# Patient Record
Sex: MALE | Race: BLACK OR AFRICAN AMERICAN | Employment: UNEMPLOYED | URBAN - METROPOLITAN AREA
[De-identification: names, ages, dates, MRNs, and addresses within clinical notes are randomized per-mention and may not be internally consistent; named-entity substitution may affect disease eponyms.]

---

## 2018-10-05 ENCOUNTER — OFFICE VISIT (OUTPATIENT)
Dept: FAMILY MEDICINE CLINIC | Age: 23
End: 2018-10-05

## 2018-10-05 VITALS
WEIGHT: 138.6 LBS | OXYGEN SATURATION: 97 % | BODY MASS INDEX: 21.75 KG/M2 | RESPIRATION RATE: 16 BRPM | DIASTOLIC BLOOD PRESSURE: 65 MMHG | HEIGHT: 67 IN | HEART RATE: 71 BPM | SYSTOLIC BLOOD PRESSURE: 104 MMHG | TEMPERATURE: 98.2 F

## 2018-10-05 DIAGNOSIS — L72.0 EPIDERMAL CYST OF NECK: Primary | ICD-10-CM

## 2018-10-05 NOTE — MR AVS SNAPSHOT
[facility] 
 
 
 [address] [address] [address] [address] 
[phone] Patient: Bryanna Gibbs MRN: [id number] :1995 Visit Information Date & Time Provider Department Dept. Phone Encounter #  
 10/5/2018  2:00 PM Jossue Macias NP Jeet Romulo [address] [id number] Upcoming Health Maintenance Date Due  
 HPV Age 9Y-34Y (3 of 1 - Male 3 Dose Series) 2008 DTaP/Tdap/Td series (1 - Tdap) 2018 Influenza Age 5 to Adult 2018 Allergies as of 10/5/2018  Review Complete On: 10/5/2018 By: Jossue Macias NP Severity Noted Reaction Type Reactions Cranberry Extract  10/05/2018    Hives Fruit Current Immunizations  Never Reviewed No immunizations on file. Not reviewed this visit You Were Diagnosed With   
  
 Codes Comments Epidermal cyst of neck    -  Primary ICD-10-CM: L72.0 ICD-9-CM: 706. 2 Vitals BP Pulse Temp Resp Height(growth percentile) Weight(growth percentile) 104/65 (BP 1 Location: Left arm) 71 98.2 °F (36.8 °C) (Oral) 16 5' 7\" (1.702 m) 138 lb 9.6 oz (62.9 kg) SpO2 BMI Smoking Status 97% 21.71 kg/m2 Never Smoker BMI and BSA Data Body Mass Index Body Surface Area 21.71 kg/m 2 1.72 m 2 Your Updated Medication List  
  
   
This list is accurate as of 10/5/18  2:31 PM.  Always use your most recent med list.  
  
  
  
  
 [place] Take  by mouth daily. Patient Instructions A Healthy Lifestyle: Care Instructions Your Care Instructions A healthy lifestyle can help you feel good, stay at a healthy weight, and have plenty of energy for both work and play. A healthy lifestyle is something you can share with your whole family. A healthy lifestyle also can lower your risk for serious health problems, such as high blood pressure, heart disease, and diabetes. You can follow a few steps listed below to improve your health and the health of your family. Follow-up care is a key part of your treatment and safety. Be sure to make and go to all appointments, and call your doctor if you are having problems. It's also a good idea to know your test results and keep a list of the medicines you take. How can you care for yourself at home? · Do not eat too much sugar, fat, or fast foods. You can still have dessert and treats now and then. The goal is moderation. · Start small to improve your eating habits. Pay attention to portion sizes, drink less juice and soda pop, and eat more fruits and vegetables. ¨ Eat a healthy amount of food. A 3-ounce serving of meat, for example, is about the size of a deck of cards. Fill the rest of your plate with vegetables and whole grains. ¨ Limit the amount of soda and sports drinks you have every day. Drink more water when you are thirsty. ¨ Eat at least 5 servings of fruits and vegetables every day. It may seem like a lot, but it is not hard to reach this goal. A serving or helping is 1 piece of fruit, 1 cup of vegetables, or 2 cups of leafy, raw vegetables. Have an apple or some carrot sticks as an afternoon snack instead of a candy bar. Try to have fruits and/or vegetables at every meal. 
· Make exercise part of your daily routine. You may want to start with simple activities, such as walking, bicycling, or slow swimming. Try to be active 30 to 60 minutes every day. You do not need to do all 30 to 60 minutes all at once. For example, you can exercise 3 times a day for 10 or 20 minutes. Moderate exercise is safe for most people, but it is always a good idea to talk to your doctor before starting an exercise program. 
· Keep moving. Helyn Distad the lawn, work in the garden, or [place]. Take the stairs instead of the elevator at work. · If you smoke, quit.  People who smoke have an increased risk for heart attack, stroke, cancer, and other lung illnesses. Quitting is hard, but there are ways to boost your chance of quitting tobacco for good. ¨ Use nicotine gum, patches, or lozenges. ¨ Ask your doctor about stop-smoking programs and medicines. ¨ Keep trying. In addition to reducing your risk of diseases in the future, you will notice some benefits soon after you stop using tobacco. If you have shortness of breath or asthma symptoms, they will likely get better within a few weeks after you quit. · Limit how much alcohol you drink. Moderate amounts of alcohol (up to 2 drinks a day for men, 1 drink a day for women) are okay. But drinking too much can lead to liver problems, high blood pressure, and other health problems. Family health If you have a family, there are many things you can do together to improve your health. · Eat meals together as a family as often as possible. · Eat healthy foods. This includes fruits, vegetables, lean meats and dairy, and whole grains. · Include your family in your fitness plan. Most people think of activities such as jogging or tennis as the way to fitness, but there are many ways you and your family can be more active. Anything that makes you breathe hard and gets your heart pumping is exercise. Here are some tips: 
¨ Walk to do errands or to take your child to school or the bus. ¨ Go for a family bike ride after dinner instead of watching TV. Where can you learn more? Go to http://payton-grupo.info/. Enter Z512 in the search box to learn more about \"A Healthy Lifestyle: Care Instructions. \" Current as of: December 7, 2017 Content Version: 11.8 © 4741-6958 [employer]. Care instructions adapted under license by [employer] (which disclaims liability or warranty for this information).  If you have questions about a medical condition or this instruction, always ask your healthcare professional. [employer] disclaims any warranty or liability for your use of this information. Introducing [facility]! [facility] introduces [employer] patient portal. Now you can access parts of your medical record, email your doctor's office, and request medication refills online. 1. In your internet browser, go to [email or web address] 2. Click on the First Time User? Click Here link in the Sign In box. You will see the New Member Sign Up page. 3. Enter your [employer] Access Code exactly as it appears below. You will not need to use this code after youve completed the sign-up process. If you do not sign up before the expiration date, you must request a new code. · [employer] Access Code: [email or web address] Expires: 1/3/2019  2:31 PM 
 
4. Enter the last four digits of your Social Security Number (xxxx) and Date of Birth (mm/dd/yyyy) as indicated and click Submit. You will be taken to the next sign-up page. 5. Create a [employer] ID. This will be your [employer] login ID and cannot be changed, so think of one that is secure and easy to remember. 6. Create a [employer] password. You can change your password at any time. 7. Enter your Password Reset Question and Answer. This can be used at a later time if you forget your password. 8. Enter your e-mail address. You will receive e-mail notification when new information is available in [address] 9. Click Sign Up. You can now view and download portions of your medical record. 10. Click the Download Summary menu link to download a portable copy of your medical information. If you have questions, please visit the Frequently Asked Questions section of the [employer] website. Remember, [employer] sent[employer] is NOT to be used for urgent needs. For medical emergencies, dial 911. Now available from your iPhone and Android! Please provide this summary of care documentation to your next provider. If you have any questions after today's visit, please call [phone].

## 2018-10-05 NOTE — LETTER
NOTIFICATION RETURN TO WORK / SCHOOL 
 
10/5/2018 2:30 PM 
 
Mr. Phi Painter [address] [facility] [address] To Whom It May Concern: 
 
Phi Painter [facility] He was seen today for a epidermal cyst of the neck. This is benign. If there are questions or concerns please have the patient contact our office. Sincerely, Jono Phillips NP

## 2018-10-05 NOTE — PROGRESS NOTES
Pt is here to establish care. C/O lump on neck x 10+ years    1. Have you been to the ER, urgent care clinic since your last visit? Hospitalized since your last visit? No    2. Have you seen or consulted any other health care providers outside of the [address] [place] [address] since your last visit? Include any pap smears or colon screening.  No

## 2018-10-05 NOTE — PROGRESS NOTES
HISTORY OF PRESENT ILLNESS  Zhou Hidalgo is a 26 y.o. male. Patient presents lump on neck. HPI  Pt wants to join the [employer]. He had labs today and was told to have this lump on his neck checked out by his PCP. He states he has had this since he was a teenager. Review of Systems   Constitutional: Negative. HENT: Negative. Respiratory: Negative. Cardiovascular: Negative. Visit Vitals    /65 (BP 1 Location: Left arm)    Pulse 71    Temp 98.2 °F (36.8 °C) (Oral)    Resp 16    Ht 5' 7\" (1.702 m)    Wt 138 lb 9.6 oz (62.9 kg)    SpO2 97%    BMI 21.71 kg/m2     Physical Exam   Constitutional: He appears well-developed. No distress. HENT:   Right Ear: Tympanic membrane and external ear normal.   Left Ear: Tympanic membrane and external ear normal.   Nose: Nose normal.   Mouth/Throat: Oropharynx is clear and moist. No oropharyngeal exudate. Neck: Normal range of motion. Neck supple. Cardiovascular: Normal rate, regular rhythm and normal heart sounds. No murmur heard. Pulmonary/Chest: Effort normal and breath sounds normal. No respiratory distress. He has no wheezes. He has no rales. skin: neck there is a round mobile fluctuant mass about the size of a small marble. ASSESSMENT and PLAN    ICD-10-CM ICD-9-CM    1. Epidermal cyst of neck L72.0 706.2      PLAN:  I reassured Pt that this is benign. He may see a gen surg who can remove this. Pt is to call with any concerns. Pt given after visit summary.

## 2018-10-05 NOTE — PATIENT INSTRUCTIONS

## 2018-11-02 ENCOUNTER — TELEPHONE (OUTPATIENT)
Dept: FAMILY MEDICINE CLINIC | Age: 23
End: 2018-11-02

## 2018-11-02 NOTE — TELEPHONE ENCOUNTER
Pt request referral to ENT.  Stated per the [employer] (he is joining [facility]) they need him to see a specialist re the cyst on his neck

## 2018-11-06 DIAGNOSIS — L72.0 EPIDERMAL CYST OF NECK: Primary | ICD-10-CM

## 2018-12-03 ENCOUNTER — TELEPHONE (OUTPATIENT)
Dept: FAMILY MEDICINE CLINIC | Age: 23
End: 2018-12-03

## 2018-12-03 NOTE — TELEPHONE ENCOUNTER
Staff Levon Shipman is calling to check on the status of the form that she faxed over for the pt , Please advise.        [phone]

## 2018-12-04 NOTE — TELEPHONE ENCOUNTER
Staff Ross Morrow calling for status. Stated hopeful to receive this soon.  Requesting please process, pt waiting to join

## 2018-12-24 ENCOUNTER — TELEPHONE (OUTPATIENT)
Dept: FAMILY MEDICINE CLINIC | Age: 23
End: 2018-12-24

## 2018-12-24 NOTE — TELEPHONE ENCOUNTER
Chandrika referral request was submitted with office notes for approval for Dr Ricardo Shultz for an appointment on 12/26/18.  [employer] pended referral request for approval.

## 2018-12-24 NOTE — TELEPHONE ENCOUNTER
Isabel with [facility] re Ins referral     Pt has appt 12/26/2018 and has   [address] [phone]    Has an ins referral been sent    They are open until 3 pm today

## 2018-12-24 NOTE — TELEPHONE ENCOUNTER
Robbin Menjivar from [facility] is calling to speak with the referral coordinator, she needs to verify the pt's referral , the referral says urgent but she is questioning it . Please Advise.       [phone]

## 2018-12-26 NOTE — TELEPHONE ENCOUNTER
Isabel from [address] [place] [address] is calling to gat that the patient was a No Show. Patient phone number on file is unresponsive and she is unable to leave any messages.

## 2019-01-04 NOTE — TELEPHONE ENCOUNTER
Per [facility] referral patient was referred to be seen at [facility].  Approval [id number]

## 2019-01-09 ENCOUNTER — OFFICE VISIT (OUTPATIENT)
Dept: SURGERY | Age: 24
End: 2019-01-09

## 2019-03-11 ENCOUNTER — HOSPITAL ENCOUNTER (OUTPATIENT)
Dept: LAB | Age: 24
Discharge: HOME | End: 2019-03-11
Payer: OTHER GOVERNMENT

## 2019-03-11 ENCOUNTER — OFFICE VISIT (OUTPATIENT)
Dept: FAMILY MEDICINE CLINIC | Age: 24
End: 2019-03-11

## 2019-03-11 VITALS
OXYGEN SATURATION: 98 % | SYSTOLIC BLOOD PRESSURE: 109 MMHG | BODY MASS INDEX: 22.6 KG/M2 | DIASTOLIC BLOOD PRESSURE: 60 MMHG | WEIGHT: 144 LBS | RESPIRATION RATE: 17 BRPM | TEMPERATURE: 98.3 F | HEIGHT: 67 IN | HEART RATE: 75 BPM

## 2019-03-11 DIAGNOSIS — Z00.00 VISIT FOR WELL MAN HEALTH CHECK: Primary | ICD-10-CM

## 2019-03-11 DIAGNOSIS — Z13.29 SCREENING FOR THYROID DISORDER: ICD-10-CM

## 2019-03-11 DIAGNOSIS — Z13.220 SCREENING FOR HYPERLIPIDEMIA: ICD-10-CM

## 2019-03-11 DIAGNOSIS — Z13.0 SCREENING FOR DEFICIENCY ANEMIA: ICD-10-CM

## 2019-03-11 DIAGNOSIS — Z13.1 SCREENING FOR DIABETES MELLITUS: ICD-10-CM

## 2019-03-11 DIAGNOSIS — Z13.21 ENCOUNTER FOR VITAMIN DEFICIENCY SCREENING: ICD-10-CM

## 2019-03-11 LAB
ALBUMIN SERPL-MCNC: 4.6 G/DL (ref 3.4–5)
ALBUMIN/GLOB SERPL: 1.6 {RATIO} (ref 0.8–1.7)
ALP SERPL-CCNC: 131 U/L (ref 45–117)
ALT SERPL-CCNC: 18 U/L (ref 16–61)
ANION GAP SERPL CALC-SCNC: 5 MMOL/L (ref 3–18)
AST SERPL-CCNC: 15 U/L (ref 15–37)
BILIRUB SERPL-MCNC: 0.6 MG/DL (ref 0.2–1)
BUN SERPL-MCNC: 13 MG/DL (ref 7–18)
BUN/CREAT SERPL: 16 (ref 12–20)
CALCIUM SERPL-MCNC: 9.7 MG/DL (ref 8.5–10.1)
CHLORIDE SERPL-SCNC: 103 MMOL/L (ref 100–108)
CHOLEST SERPL-MCNC: 119 MG/DL
CO2 SERPL-SCNC: 31 MMOL/L (ref 21–32)
CREAT SERPL-MCNC: 0.8 MG/DL (ref 0.6–1.3)
GLOBULIN SER CALC-MCNC: 2.8 G/DL (ref 2–4)
GLUCOSE SERPL-MCNC: 89 MG/DL (ref 74–99)
HCT VFR BLD AUTO: 44.3 % (ref 36–48)
HDLC SERPL-MCNC: 67 MG/DL (ref 40–60)
HDLC SERPL: 1.8 {RATIO} (ref 0–5)
HGB BLD-MCNC: 14.5 G/DL (ref 13–16)
LDLC SERPL CALC-MCNC: 44 MG/DL (ref 0–100)
LIPID PROFILE,FLP: ABNORMAL
POTASSIUM SERPL-SCNC: 4.4 MMOL/L (ref 3.5–5.5)
PROT SERPL-MCNC: 7.4 G/DL (ref 6.4–8.2)
SODIUM SERPL-SCNC: 139 MMOL/L (ref 136–145)
TRIGL SERPL-MCNC: 40 MG/DL (ref ?–150)
TSH SERPL DL<=0.05 MIU/L-ACNC: 1.1 UIU/ML (ref 0.36–3.74)
VLDLC SERPL CALC-MCNC: 8 MG/DL

## 2019-03-11 PROCEDURE — 82306 VITAMIN D 25 HYDROXY: CPT

## 2019-03-11 PROCEDURE — 80053 COMPREHEN METABOLIC PANEL: CPT

## 2019-03-11 PROCEDURE — 84443 ASSAY THYROID STIM HORMONE: CPT

## 2019-03-11 PROCEDURE — 80061 LIPID PANEL: CPT

## 2019-03-11 PROCEDURE — 85018 HEMOGLOBIN: CPT

## 2019-03-11 PROCEDURE — 36415 COLL VENOUS BLD VENIPUNCTURE: CPT

## 2019-03-11 NOTE — PROGRESS NOTES
Subjective:   Juliet Ly is a 26 y.o. male presenting for his annual checkup. ROS:  Feeling well. No dyspnea or chest pain on exertion. No abdominal pain, change in bowel habits, black or bloody stools. No urinary tract or prostatic symptoms. No neurological complaints. Specific concerns today: none. Patient Active Problem List    Diagnosis Date Noted    Allergic rhinitis 04/25/2008     Current Outpatient Medications   Medication Sig Dispense Refill    Cetirizine (ZYRTEC) 10 mg cap Take  by mouth.  emtricitabine/tenofovir, TDF, (TRUVADA PO) Take  by mouth daily. 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 Family History   Problem Relation Age of Onset    No Known Problems Mother      Social History     Tobacco Use    Smoking status: Never Smoker    Smokeless tobacco: Never Used   Substance Use Topics    Alcohol use: Not on file             Visit Vitals  /60 (BP 1 Location: Left arm, BP Patient Position: Sitting)   Pulse 75   Temp 98.3 °F (36.8 °C) (Oral)   Resp 17   Ht 5' 7\" (1.702 m)   Wt 144 lb (65.3 kg)   SpO2 98%   BMI 22.55 kg/m²         Objective: There were no vitals taken for this visit. The patient appears well, alert, oriented x 3, in no distress. ENT normal.  Neck supple. No adenopathy or thyromegaly. NICOL. Lungs are clear, good air entry, no wheezes, rhonchi or rales. S1 and S2 normal, no murmurs, regular rate and rhythm.  Abdomen is soft without tenderness, guarding, mass or organomegaly.  exam: no penile lesions or discharge, no testicular masses or tenderness, no hernias. Extremities show no edema, normal peripheral pulses. Neurological is normal without focal findings. Assessment/Plan:   healthy adult male  routine labs ordered. ICD-10-CM ICD-9-CM    1. Visit for well man health check Z00.00 V70.0    2. Screening for diabetes mellitus B14.4 I20.0 METABOLIC PANEL, COMPREHENSIVE   3. Screening for thyroid disorder Z13.29 V77.0 TSH 3RD GENERATION   4. Encounter for vitamin deficiency screening Z13.21 V77.99 VITAMIN D, 25 HYDROXY   5. Screening for deficiency anemia Z13.0 V78.1 HGB & HCT   6. Screening for hyperlipidemia Z13.220 V77.91 LIPID PANEL   . PLAN:  We discussed screening recommendations. I have discussed the diagnosis with the patient and the intended plan as seen in the above orders. The patient has received an after-visit summary and questions were answered concerning future plans. I have discussed medication side effects and warnings with the patient as well. Patient will call for further questions. Follow-up Disposition:  Return in about 1 year (around 3/11/2020) for [facility].

## 2019-03-11 NOTE — PROGRESS NOTES
Chief Complaint   Patient presents with    Well Male     1. Have you been to the ER, urgent care clinic since your last visit? Hospitalized since your last visit? No    2. Have you seen or consulted any other health care providers outside of the [address] [place] [address] since your last visit? Include any pap smears or colon screening.  No

## 2019-03-12 LAB — 25(OH)D3 SERPL-MCNC: 10.2 NG/ML (ref 30–100)

## 2019-03-13 RX ORDER — ERGOCALCIFEROL 1.25 MG/1
50000 CAPSULE ORAL
Qty: 12 CAP | Refills: 3 | Status: SHIPPED | OUTPATIENT
Start: 2019-03-13 | End: 2020-10-16 | Stop reason: ALTCHOICE

## 2020-04-30 RX ORDER — ERGOCALCIFEROL 1.25 MG/1
CAPSULE ORAL
Qty: 4 CAP | Refills: 0 | OUTPATIENT
Start: 2020-04-30

## 2020-06-26 ENCOUNTER — OFFICE VISIT (OUTPATIENT)
Dept: FAMILY MEDICINE CLINIC | Age: 25
End: 2020-06-26

## 2020-06-26 VITALS
BODY MASS INDEX: 22.6 KG/M2 | OXYGEN SATURATION: 97 % | RESPIRATION RATE: 18 BRPM | TEMPERATURE: 99.8 F | WEIGHT: 144 LBS | HEIGHT: 67 IN | SYSTOLIC BLOOD PRESSURE: 105 MMHG | HEART RATE: 101 BPM | DIASTOLIC BLOOD PRESSURE: 67 MMHG

## 2020-06-26 DIAGNOSIS — Z13.1 SCREENING FOR DIABETES MELLITUS: ICD-10-CM

## 2020-06-26 DIAGNOSIS — Z13.21 ENCOUNTER FOR VITAMIN DEFICIENCY SCREENING: ICD-10-CM

## 2020-06-26 DIAGNOSIS — Z13.220 SCREENING FOR HYPERLIPIDEMIA: ICD-10-CM

## 2020-06-26 DIAGNOSIS — Z13.0 SCREENING FOR DEFICIENCY ANEMIA: ICD-10-CM

## 2020-06-26 DIAGNOSIS — Z00.00 VISIT FOR WELL MAN HEALTH CHECK: Primary | ICD-10-CM

## 2020-06-26 DIAGNOSIS — Z13.29 SCREENING FOR THYROID DISORDER: ICD-10-CM

## 2020-06-26 RX ORDER — ERGOCALCIFEROL 1.25 MG/1
50000 CAPSULE ORAL
Qty: 12 CAP | Refills: 3 | Status: CANCELLED | OUTPATIENT
Start: 2020-06-26

## 2020-06-26 NOTE — PROGRESS NOTES
Subjective:   Val Wade is a 23 y.o. male presenting for his annual checkup. ROS:  Feeling well. No dyspnea or chest pain on exertion. No abdominal pain, change in bowel habits, black or bloody stools. No urinary tract or prostatic symptoms. No neurological complaints. Specific concerns today: none. He is doing well. 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 Patient Active Problem List    Diagnosis Date Noted    Allergic rhinitis 04/25/2008     Current Outpatient Medications   Medication Sig Dispense Refill    ergocalciferol (ERGOCALCIFEROL) 50,000 unit capsule Take 1 Cap by mouth every seven (7) days. 12 Cap 3    Cetirizine (ZYRTEC) 10 mg cap Take  by mouth. 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 Family History   Problem Relation Age of Onset    No Known Problems Mother      Social History     Tobacco Use    Smoking status: Never Smoker    Smokeless tobacco: Never Used   Substance Use Topics    Alcohol use: Not on file        Objective:     Visit Vitals  /67   Pulse (!) 101   Temp 99.8 °F (37.7 °C) (Skin)   Resp 18   Ht 5' 7\" (1.702 m)   Wt 144 lb (65.3 kg)   SpO2 97%   BMI 22.55 kg/m²     The patient appears well, alert, oriented x 3, in no distress. ENT normal.  Neck supple. No adenopathy or thyromegaly. NICOL. Lungs are clear, good air entry, no wheezes, rhonchi or rales. S1 and S2 normal, no murmurs, regular rate and rhythm.  Abdomen is soft without tenderness, guarding, mass or organomegaly.  exam: no penile lesions or discharge, no testicular masses or tenderness, no hernias. Extremities show no edema, normal peripheral pulses. Neurological is normal without focal findings. Assessment/Plan:   healthy adult male  routine labs ordered. ICD-10-CM ICD-9-CM    1. Visit for well man health check Z00.00 V70.0    2. Screening for diabetes mellitus E52.2 H38.7 METABOLIC PANEL, COMPREHENSIVE   3. Screening for thyroid disorder Z13.29 V77.0 TSH 3RD GENERATION   4. Encounter for vitamin deficiency screening Z13.21 V77.99 VITAMIN D, 25 HYDROXY   5. Screening for deficiency anemia Z13.0 V78.1 HGB & HCT   6. Screening for hyperlipidemia Z13.220 V77.91 LIPID PANEL   . PLAN:    I have discussed the diagnosis with the patient and the intended plan as seen in the above orders. The patient has received an after-visit summary and questions were answered concerning future plans. I have discussed medication side effects and warnings with the patient as well. Patient will call for further questions. Follow-up and Dispositions    · Return in about 1 year (around 6/26/2021) for annual wellness.

## 2020-06-26 NOTE — PROGRESS NOTES
Terese Parker is a  23 y.o. male presents today for office visit for a general check up. 1. Have you been to the ER, urgent care clinic or hospitalized since your last visit? NO     2. Have you seen or consulted any other health care providers outside of the [address] [place] [address] since your last visit (Include any pap smears or colon screening)? NO

## 2020-07-01 ENCOUNTER — HOSPITAL ENCOUNTER (OUTPATIENT)
Dept: LAB | Age: 25
Discharge: HOME | End: 2020-07-01
Payer: COMMERCIAL

## 2020-07-01 DIAGNOSIS — Z13.21 ENCOUNTER FOR VITAMIN DEFICIENCY SCREENING: ICD-10-CM

## 2020-07-01 DIAGNOSIS — Z13.0 SCREENING FOR DEFICIENCY ANEMIA: ICD-10-CM

## 2020-07-01 DIAGNOSIS — Z13.220 SCREENING FOR HYPERLIPIDEMIA: ICD-10-CM

## 2020-07-01 DIAGNOSIS — Z13.1 SCREENING FOR DIABETES MELLITUS: ICD-10-CM

## 2020-07-01 DIAGNOSIS — Z13.29 SCREENING FOR THYROID DISORDER: ICD-10-CM

## 2020-07-01 PROCEDURE — 80061 LIPID PANEL: CPT

## 2020-07-01 PROCEDURE — 84443 ASSAY THYROID STIM HORMONE: CPT

## 2020-07-01 PROCEDURE — 80053 COMPREHEN METABOLIC PANEL: CPT

## 2020-07-01 PROCEDURE — 82306 VITAMIN D 25 HYDROXY: CPT

## 2020-07-01 PROCEDURE — 85018 HEMOGLOBIN: CPT

## 2020-07-02 LAB
25(OH)D3 SERPL-MCNC: 14.9 NG/ML (ref 30–100)
ALBUMIN SERPL-MCNC: 4.6 G/DL (ref 3.4–5)
ALBUMIN/GLOB SERPL: 1.3 {RATIO} (ref 0.8–1.7)
ALP SERPL-CCNC: 99 U/L (ref 45–117)
ALT SERPL-CCNC: 26 U/L (ref 16–61)
ANION GAP SERPL CALC-SCNC: 5 MMOL/L (ref 3–18)
AST SERPL-CCNC: 12 U/L (ref 10–38)
BILIRUB SERPL-MCNC: 0.7 MG/DL (ref 0.2–1)
BUN SERPL-MCNC: 15 MG/DL (ref 7–18)
BUN/CREAT SERPL: 16 (ref 12–20)
CALCIUM SERPL-MCNC: 10.1 MG/DL (ref 8.5–10.1)
CHLORIDE SERPL-SCNC: 105 MMOL/L (ref 100–111)
CHOLEST SERPL-MCNC: 145 MG/DL
CO2 SERPL-SCNC: 29 MMOL/L (ref 21–32)
CREAT SERPL-MCNC: 0.95 MG/DL (ref 0.6–1.3)
GLOBULIN SER CALC-MCNC: 3.5 G/DL (ref 2–4)
GLUCOSE SERPL-MCNC: 77 MG/DL (ref 74–99)
HCT VFR BLD AUTO: 43.2 % (ref 36–48)
HDLC SERPL-MCNC: 53 MG/DL (ref 40–60)
HDLC SERPL: 2.7 {RATIO} (ref 0–5)
HGB BLD-MCNC: 14.8 G/DL (ref 13–16)
LDLC SERPL CALC-MCNC: 75 MG/DL (ref 0–100)
LIPID PROFILE,FLP: NORMAL
POTASSIUM SERPL-SCNC: 4.3 MMOL/L (ref 3.5–5.5)
PROT SERPL-MCNC: 8.1 G/DL (ref 6.4–8.2)
SODIUM SERPL-SCNC: 139 MMOL/L (ref 136–145)
TRIGL SERPL-MCNC: 85 MG/DL (ref ?–150)
TSH SERPL DL<=0.05 MIU/L-ACNC: 1.08 UIU/ML (ref 0.36–3.74)
VLDLC SERPL CALC-MCNC: 17 MG/DL

## 2020-10-15 ENCOUNTER — VIRTUAL VISIT (OUTPATIENT)
Dept: FAMILY MEDICINE CLINIC | Age: 25
End: 2020-10-15

## 2020-10-15 NOTE — PROGRESS NOTES
This encounter was created in error - please disregard. Doxy Link sent x 2, no response, no show for appointment.

## 2020-10-16 ENCOUNTER — VIRTUAL VISIT (OUTPATIENT)
Dept: FAMILY MEDICINE CLINIC | Age: 25
End: 2020-10-16
Payer: COMMERCIAL

## 2020-10-16 DIAGNOSIS — L25.9 CONTACT DERMATITIS, UNSPECIFIED CONTACT DERMATITIS TYPE, UNSPECIFIED TRIGGER: Primary | ICD-10-CM

## 2020-10-16 DIAGNOSIS — E55.9 VITAMIN D DEFICIENCY: ICD-10-CM

## 2020-10-16 PROCEDURE — 99213 OFFICE O/P EST LOW 20 MIN: CPT | Performed by: NURSE PRACTITIONER

## 2020-10-16 RX ORDER — TRIAMCINOLONE ACETONIDE 0.25 MG/G
CREAM TOPICAL 2 TIMES DAILY
Qty: 15 G | Refills: 0 | Status: SHIPPED | OUTPATIENT
Start: 2020-10-16 | End: 2021-08-23 | Stop reason: SDUPTHER

## 2020-10-16 RX ORDER — ERGOCALCIFEROL 1.25 MG/1
50000 CAPSULE ORAL
Qty: 12 CAP | Refills: 3 | Status: SHIPPED | OUTPATIENT
Start: 2020-10-16 | End: 2022-09-29 | Stop reason: ALTCHOICE

## 2020-10-16 RX ORDER — FEXOFENADINE HCL AND PSEUDOEPHEDRINE HCI 60; 120 MG/1; MG/1
1 TABLET, EXTENDED RELEASE ORAL EVERY 12 HOURS
COMMUNITY

## 2020-10-16 NOTE — PROGRESS NOTES
Amanda Stinson is a 23 y.o. male who was seen by synchronous (real-time) audio-video technology on 10/16/2020 for Rash and Vitamin D Deficiency    Pt is being evaluated for a left arm rash that has been present for 2-3 weeks. It is papular and itchy in nature. Pt denies a change in soaps, detergents or any other trigger that may have precipitated the rash. Pt does state that he had eczema as a child that looks very similar to his current symptoms. Pt's labs from July also show that he is still very deficient in vit. D show a new prescription was sent over for replacement. Assessment & Plan:   Diagnoses and all orders for this visit:    1. Contact dermatitis, unspecified contact dermatitis type, unspecified trigger  -     triamcinolone acetonide (KENALOG) 0.025 % topical cream; Apply  to affected area two (2) times a day. use thin layer    2. Vitamin D deficiency  -     ergocalciferol (ERGOCALCIFEROL) 1,250 mcg (50,000 unit) capsule; Take 1 Cap by mouth every seven (7) days. Follow-up and Dispositions    · Return in about 2 weeks (around 10/30/2020), or if symptoms worsen or fail to improve. 712  Subjective:       Prior to Admission medications    Medication Sig Start Date End Date Taking? Authorizing Provider   fexofenadine-pseudoephedrine (Allegra-D 12 Hour)  mg Tb12 Take 1 Tab by mouth every twelve (12) hours. Yes Provider, Historical   triamcinolone acetonide (KENALOG) 0.025 % topical cream Apply  to affected area two (2) times a day. use thin layer 10/16/20  Yes Jareth Tubbs NP   ergocalciferol (ERGOCALCIFEROL) 1,250 mcg (50,000 unit) capsule Take 1 Cap by mouth every seven (7) days. 10/16/20  Yes Jareth Tubbs NP   ergocalciferol (ERGOCALCIFEROL) 50,000 unit capsule Take 1 Cap by mouth every seven (7) days. 3/13/19 10/16/20  Wilfredo Otero NP   Cetirizine (ZYRTEC) 10 mg cap Take  by mouth.     Provider, Historical     Patient Active Problem List   Diagnosis Code    Allergic rhinitis J30.9    Vitamin D deficiency E55.9    Contact dermatitis L25.9     Current Outpatient Medications   Medication Sig Dispense Refill    fexofenadine-pseudoephedrine (Allegra-D 12 Hour)  mg Tb12 Take 1 Tab by mouth every twelve (12) hours.  triamcinolone acetonide (KENALOG) 0.025 % topical cream Apply  to affected area two (2) times a day. use thin layer 15 g 0    ergocalciferol (ERGOCALCIFEROL) 1,250 mcg (50,000 unit) capsule Take 1 Cap by mouth every seven (7) days. 12 Cap 3    Cetirizine (ZYRTEC) 10 mg cap Take  by mouth. 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 11/09/2011    External records       Review of Systems   Skin: Positive for itching and rash. Objective:   No flowsheet data found. General: alert, cooperative, no distress   Mental  status: normal mood, behavior, speech, dress, motor activity, and thought processes, able to follow commands   HENT: NCAT   Neck: no visualized mass   Resp: no respiratory distress   Neuro: no gross deficits   Skin: no discoloration or lesions of concern on visible areas   Psychiatric: normal affect, consistent with stated mood, no evidence of hallucinations     Additional exam findings: N/A      We discussed the expected course, resolution and complications of the diagnosis(es) in detail. Medication risks, benefits, costs, interactions, and alternatives were discussed as indicated.   I advised him to contact the office if his condition worsens, changes or fails to improve as anticipated. He expressed understanding with the diagnosis(es) and plan. Teressa Pacheco, who was evaluated through a patient-initiated, synchronous (real-time) audio-video encounter, and/or his healthcare decision maker, is aware that it is a billable service, with coverage as determined by his insurance carrier. He provided verbal consent to proceed: Yes, and patient identification was verified. It was conducted pursuant to the emergency declaration under the [address] [place] [address] and the [employer] and [employer] General Act. A caregiver was present when appropriate. Ability to conduct physical exam was limited. I was in the office. The patient was at home.       Gin Cartwright NP

## 2021-08-23 ENCOUNTER — HOSPITAL ENCOUNTER (OUTPATIENT)
Dept: LAB | Age: 26
Discharge: HOME | End: 2021-08-23
Payer: COMMERCIAL

## 2021-08-23 ENCOUNTER — OFFICE VISIT (OUTPATIENT)
Dept: FAMILY MEDICINE CLINIC | Age: 26
End: 2021-08-23
Payer: COMMERCIAL

## 2021-08-23 VITALS
DIASTOLIC BLOOD PRESSURE: 72 MMHG | OXYGEN SATURATION: 95 % | HEIGHT: 67 IN | SYSTOLIC BLOOD PRESSURE: 116 MMHG | HEART RATE: 69 BPM | TEMPERATURE: 97.3 F | BODY MASS INDEX: 30.98 KG/M2 | RESPIRATION RATE: 16 BRPM | WEIGHT: 197.4 LBS

## 2021-08-23 DIAGNOSIS — L25.9 CONTACT DERMATITIS, UNSPECIFIED CONTACT DERMATITIS TYPE, UNSPECIFIED TRIGGER: ICD-10-CM

## 2021-08-23 DIAGNOSIS — Z00.00 ROUTINE MEDICAL EXAM: Primary | ICD-10-CM

## 2021-08-23 DIAGNOSIS — Z00.00 ROUTINE MEDICAL EXAM: ICD-10-CM

## 2021-08-23 DIAGNOSIS — E55.9 VITAMIN D DEFICIENCY: ICD-10-CM

## 2021-08-23 DIAGNOSIS — E66.9 CLASS 1 OBESITY WITHOUT SERIOUS COMORBIDITY WITH BODY MASS INDEX (BMI) OF 30.0 TO 30.9 IN ADULT, UNSPECIFIED OBESITY TYPE: ICD-10-CM

## 2021-08-23 LAB
25(OH)D3 SERPL-MCNC: 22.3 NG/ML (ref 30–100)
ANION GAP SERPL CALC-SCNC: 5 MMOL/L (ref 3–18)
BASOPHILS # BLD: 0 K/UL (ref 0–0.1)
BASOPHILS NFR BLD: 0 % (ref 0–2)
BUN SERPL-MCNC: 14 MG/DL (ref 7–18)
BUN/CREAT SERPL: 15 (ref 12–20)
CALCIUM SERPL-MCNC: 9.5 MG/DL (ref 8.5–10.1)
CHLORIDE SERPL-SCNC: 106 MMOL/L (ref 100–111)
CO2 SERPL-SCNC: 28 MMOL/L (ref 21–32)
CREAT SERPL-MCNC: 0.96 MG/DL (ref 0.6–1.3)
DIFFERENTIAL METHOD BLD: ABNORMAL
EOSINOPHIL # BLD: 0.1 K/UL (ref 0–0.4)
EOSINOPHIL NFR BLD: 2 % (ref 0–5)
ERYTHROCYTE [DISTWIDTH] IN BLOOD BY AUTOMATED COUNT: 11.2 % (ref 11.6–14.5)
GLUCOSE SERPL-MCNC: 87 MG/DL (ref 74–99)
HCT VFR BLD AUTO: 43.7 % (ref 36–48)
HGB BLD-MCNC: 14.4 G/DL (ref 13–16)
LYMPHOCYTES # BLD: 2.7 K/UL (ref 0.9–3.6)
LYMPHOCYTES NFR BLD: 39 % (ref 21–52)
MCH RBC QN AUTO: 31 PG (ref 24–34)
MCHC RBC AUTO-ENTMCNC: 33 G/DL (ref 31–37)
MCV RBC AUTO: 94.2 FL (ref 74–97)
MONOCYTES # BLD: 0.5 K/UL (ref 0.05–1.2)
MONOCYTES NFR BLD: 8 % (ref 3–10)
NEUTS SEG # BLD: 3.6 K/UL (ref 1.8–8)
NEUTS SEG NFR BLD: 52 % (ref 40–73)
PLATELET # BLD AUTO: 277 K/UL (ref 135–420)
PMV BLD AUTO: 10 FL (ref 9.2–11.8)
POTASSIUM SERPL-SCNC: 4.1 MMOL/L (ref 3.5–5.5)
RBC # BLD AUTO: 4.64 M/UL (ref 4.35–5.65)
SODIUM SERPL-SCNC: 139 MMOL/L (ref 136–145)
WBC # BLD AUTO: 7 K/UL (ref 4.6–13.2)

## 2021-08-23 PROCEDURE — 87491 CHLMYD TRACH DNA AMP PROBE: CPT

## 2021-08-23 PROCEDURE — 86780 TREPONEMA PALLIDUM: CPT

## 2021-08-23 PROCEDURE — 87389 HIV-1 AG W/HIV-1&-2 AB AG IA: CPT

## 2021-08-23 PROCEDURE — 82306 VITAMIN D 25 HYDROXY: CPT

## 2021-08-23 PROCEDURE — 86592 SYPHILIS TEST NON-TREP QUAL: CPT

## 2021-08-23 PROCEDURE — 36415 COLL VENOUS BLD VENIPUNCTURE: CPT

## 2021-08-23 PROCEDURE — 86803 HEPATITIS C AB TEST: CPT

## 2021-08-23 PROCEDURE — 85025 COMPLETE CBC W/AUTO DIFF WBC: CPT

## 2021-08-23 PROCEDURE — 80048 BASIC METABOLIC PNL TOTAL CA: CPT

## 2021-08-23 PROCEDURE — 87340 HEPATITIS B SURFACE AG IA: CPT

## 2021-08-23 PROCEDURE — 99395 PREV VISIT EST AGE 18-39: CPT | Performed by: NURSE PRACTITIONER

## 2021-08-23 RX ORDER — TRIAMCINOLONE ACETONIDE 0.25 MG/G
CREAM TOPICAL 2 TIMES DAILY
Qty: 15 G | Refills: 0 | Status: SHIPPED | OUTPATIENT
Start: 2021-08-23 | End: 2021-08-23

## 2021-08-23 RX ORDER — TRIAMCINOLONE ACETONIDE 0.25 MG/G
CREAM TOPICAL 2 TIMES DAILY
Qty: 15 G | Refills: 0 | Status: SHIPPED | OUTPATIENT
Start: 2021-08-23

## 2021-08-23 NOTE — PROGRESS NOTES
Subjective:   Luis Crandall is a 27 y.o. male presenting for his annual checkup. ROS:  Feeling well. No dyspnea or chest pain on exertion. No abdominal pain, change in bowel habits, black or bloody stools. No urinary tract or prostatic symptoms. No neurological complaints. Specific concerns today: Pt would like to see a nutritionist go try and eat healthier and lose weight. Lab Results   Component Value Date/Time    HGB 14.8 07/01/2020 02:00 PM    HCT 43.2 07/01/2020 02:00 PM     Lab Results   Component Value Date/Time    Cholesterol, total 145 07/01/2020 02:00 PM    HDL Cholesterol 53 07/01/2020 02:00 PM    LDL, calculated 75 07/01/2020 02:00 PM    Triglyceride 85 07/01/2020 02:00 PM    CHOL/HDL Ratio 2.7 07/01/2020 02:00 PM     Lab Results   Component Value Date/Time    Sodium 139 07/01/2020 02:00 PM    Potassium 4.3 07/01/2020 02:00 PM    Chloride 105 07/01/2020 02:00 PM    CO2 29 07/01/2020 02:00 PM    Anion gap 5 07/01/2020 02:00 PM    Glucose 77 07/01/2020 02:00 PM    BUN 15 07/01/2020 02:00 PM    Creatinine 0.95 07/01/2020 02:00 PM    BUN/Creatinine ratio 16 07/01/2020 02:00 PM    GFR est AA >60 07/01/2020 02:00 PM    GFR est non-AA >60 07/01/2020 02:00 PM    Calcium 10.1 07/01/2020 02:00 PM         Objective: There were no vitals taken for this visit. The patient appears well, alert, oriented x 3, in no distress. ENT normal.  Neck supple. No adenopathy or thyromegaly. NICOL. Lungs are clear, good air entry, no wheezes, rhonchi or rales. S1 and S2 normal, no murmurs, regular rate and rhythm. Abdomen is soft without tenderness, guarding, mass or organomegaly.  exam: deffered. Extremities show no edema, normal peripheral pulses. Neurological is normal without focal findings. Assessment/Plan:   healthy adult male  lose weight, increase physical activity, routine labs ordered. ICD-10-CM ICD-9-CM    1.  Routine medical exam  Z00.00 V70.0 CHLAMYDIA/NEISSERIA AMPLIFICATION      T PALLIDUM AB      HEP B SURFACE AG      HEPATITIS C AB      HIV 1/2 AG/AB, 4TH GENERATION,W RFLX CONFIRM      METABOLIC PANEL, BASIC      CBC WITH AUTOMATED DIFF   2. Contact dermatitis, unspecified contact dermatitis type, unspecified trigger  L25.9 692.9 triamcinolone acetonide (KENALOG) 0.025 % topical cream      DISCONTINUED: triamcinolone acetonide (KENALOG) 0.025 % topical cream   3. Vitamin D deficiency  E55.9 268.9 VITAMIN D, 25 HYDROXY   4. Class 1 obesity without serious comorbidity with body mass index (BMI) of 30.0 to 30.9 in adult, unspecified obesity type  E66.9 278.00 REFERRAL TO PHYSICAL THERAPY    Z68.30 V85.30      Follow-up and Dispositions    · Return in about 1 year (around 8/23/2022) for Well Male, (In Office).      Irno Guillen

## 2021-08-24 ENCOUNTER — APPOINTMENT (OUTPATIENT)
Dept: NUTRITION | Age: 26
End: 2021-08-24
Attending: NURSE PRACTITIONER

## 2021-08-24 LAB
HBV SURFACE AG SER QL: <0.1 INDEX
HBV SURFACE AG SER QL: NEGATIVE
HCV AB SER IA-ACNC: 0.07 INDEX
HCV AB SERPL QL IA: NEGATIVE
HCV COMMENT,HCGAC: NORMAL

## 2021-08-25 LAB
HIV 1+2 AB+HIV1 P24 AG SERPL QL IA: NONREACTIVE
HIV12 RESULT COMMENT, HHIVC: NORMAL
RPR SER QL: NONREACTIVE
T PALLIDUM AB SER QL IA: ABNORMAL

## 2021-08-26 LAB
C TRACH RRNA SPEC QL NAA+PROBE: NEGATIVE
N GONORRHOEA RRNA SPEC QL NAA+PROBE: NEGATIVE
SPECIMEN SOURCE: NORMAL

## 2021-09-10 ENCOUNTER — HOSPITAL ENCOUNTER (OUTPATIENT)
Dept: NUTRITION | Age: 26
Discharge: HOME | End: 2021-09-10
Attending: NURSE PRACTITIONER
Payer: COMMERCIAL

## 2021-09-10 PROCEDURE — 97802 MEDICAL NUTRITION INDIV IN: CPT

## 2021-09-10 NOTE — PROGRESS NOTES
[address] [place] [address]  [address] [facility], [address] [facility]  Phone: [phone] Fax: [phone]   Nutrition Assessment - Medical Nutrition Therapy   Outpatient Initial Evaluation         Patient Name: Yusef Walters : 1995   Treatment Diagnosis: Obesity    Referral Source: Sandra Ryan NP Start of Care [facility]): 9/10/2021     Gender: male Age: 27 y.o. Ht: 67 in Wt:  197 lb  kg   BMI: 31 BMR   Male 1842 BMR Female      Past Medical History:  Eczema      Pertinent Medications:   Includes: Multivitamin, Melatonin, Alegra- D, Kenalog      Biochemical Data:   No results found for: HBA1C, FQB4ZDNZ, CQE1SPFF  Lab Results   Component Value Date/Time    Sodium 139 2021 02:35 PM    Potassium 4.1 2021 02:35 PM    Chloride 106 2021 02:35 PM    CO2 28 2021 02:35 PM    Anion gap 5 2021 02:35 PM    Glucose 87 2021 02:35 PM    BUN 14 2021 02:35 PM    Creatinine 0.96 2021 02:35 PM    BUN/Creatinine ratio 15 2021 02:35 PM    GFR est AA >60 2021 02:35 PM    GFR est non-AA >60 2021 02:35 PM    Calcium 9.5 2021 02:35 PM    Bilirubin, total 0.7 2020 02:00 PM    Alk. phosphatase 99 2020 02:00 PM    Protein, total 8.1 2020 02:00 PM    Albumin 4.6 2020 02:00 PM    Globulin 3.5 2020 02:00 PM    A-G Ratio 1.3 2020 02:00 PM    ALT (SGPT) 26 2020 02:00 PM    AST (SGOT) 12 2020 02:00 PM     Lab Results   Component Value Date/Time    Cholesterol, total 145 2020 02:00 PM    HDL Cholesterol 53 2020 02:00 PM    LDL, calculated 75 2020 02:00 PM    VLDL, calculated 17 2020 02:00 PM    Triglyceride 85 2020 02:00 PM    CHOL/HDL Ratio 2.7 2020 02:00 PM     Lab Results   Component Value Date/Time    ALT (SGPT) 26 2020 02:00 PM    AST (SGOT) 12 2020 02:00 PM    Alk.  phosphatase 99 2020 02:00 PM    Bilirubin, total 0.7 2020 02:00 PM     Lab Results   Component Value Date/Time    Creatinine 0.96 08/23/2021 02:35 PM     Lab Results   Component Value Date/Time    BUN 14 08/23/2021 02:35 PM     No results found for: MCACR, MCA1, MCA2, MCA3, MCAU, MCAU2, MCALPOCT     Assessment:    Patient present to learn about nutrition related to weight loss. 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 Patient is trying to cook more at home and would like to start packing breakfast and lunch for work. Patient does not have an exercise routine. Food & Nutrition: 24 Hour Recall:  Breakfast: skips  Lunch: skips   Dinner: chicken biryani   Drinks: juice, water   Snacks: none        Estimate Needs   Calories:  2000 Protein: 125 Carbs: 225 Fat: 67   Kcal/day  g/day  g/day  g/day        percent: 25  45  30               Nutrition Diagnosis   Undesirable food choices related to food and nutrition related knowledge deficit as evidenced by patient reporting consuming one meal per day while attempting weight loss. Nutrition Intervention &  Education: Educated patient on the need for a consistent carbohydrate intake related to weight loss. Educated patient on nutrition label reading, emphasizing carbohydrates and serving size. Educated patient on protein sources, encouraged patient to incorporate mostly lean protein source with all carbohydrates. Encouraged patient to exercise 150 minutes per day.     Handouts Provided: [x]  Carbohydrates  [x]  Protein  [x]  Non-starchy Vegatbles  [x]  Food Label  [x]  Meal and Snack Ideas  []  Food Journals []  Diabetes  []  Cholesterol  []  Sodium  [x]  Gen Nutr Guidelines  []  SBGM Guidelines  []  Others:   Information Reviewed with: Patient    Readiness to Change Stage: []  Pre-contemplative    [x]  Contemplative  []  Preparation               []  Action                  []  Maintenance   Potential Barriers to Learning: []  Decline in memory    []  Language barrier   []  Other:  []  Emotional                  []  Limited mobility  [x]  Lack of motivation     [] Vision, hearing or cognitive impairment   Expected Compliance: Fair      Nutritional Goal - To promote lifestyle changes to result in:    [x]  Weight loss  []  Improved diabetic control  []  Decreased cholesterol levels  []  Decreased blood pressure  []  Weight maintenance []  Preventing any interactions associated with food allergies  []  Adequate weight gain toward goal weight  []  Other:        Patient Goals:   1. Patient will consume three meals per day 4-5 hours apart. 2. Patient will include a protein at all meals and snacks. 3. Patient will drink 64 ounces of water daily.       Dietitian Signature: Zita Lam RD Date: 9/10/2021   Follow-up: 10-15-21 @ 2:00 Time: 2:10 PM

## 2022-03-19 PROBLEM — L25.9 CONTACT DERMATITIS: Status: ACTIVE | Noted: 2020-10-16

## 2022-03-20 PROBLEM — E55.9 VITAMIN D DEFICIENCY: Status: ACTIVE | Noted: 2020-10-16

## 2022-09-29 ENCOUNTER — OFFICE VISIT (OUTPATIENT)
Dept: FAMILY MEDICINE CLINIC | Age: 27
End: 2022-09-29
Payer: COMMERCIAL

## 2022-09-29 ENCOUNTER — HOSPITAL ENCOUNTER (OUTPATIENT)
Dept: LAB | Age: 27
Discharge: HOME | End: 2022-09-29
Payer: COMMERCIAL

## 2022-09-29 VITALS
SYSTOLIC BLOOD PRESSURE: 129 MMHG | BODY MASS INDEX: 31.55 KG/M2 | OXYGEN SATURATION: 97 % | HEART RATE: 84 BPM | HEIGHT: 67 IN | TEMPERATURE: 97.9 F | DIASTOLIC BLOOD PRESSURE: 75 MMHG | WEIGHT: 201 LBS | RESPIRATION RATE: 18 BRPM

## 2022-09-29 DIAGNOSIS — Z00.00 ROUTINE MEDICAL EXAM: Primary | ICD-10-CM

## 2022-09-29 DIAGNOSIS — Z23 ENCOUNTER FOR IMMUNIZATION: ICD-10-CM

## 2022-09-29 DIAGNOSIS — E55.9 VITAMIN D DEFICIENCY: ICD-10-CM

## 2022-09-29 PROBLEM — A53.9 SYPHILIS: Status: ACTIVE | Noted: 2022-09-29

## 2022-09-29 LAB
ANION GAP SERPL CALC-SCNC: 8 MMOL/L (ref 3–18)
BUN SERPL-MCNC: 11 MG/DL (ref 7–18)
BUN/CREAT SERPL: 12 (ref 12–20)
CALCIUM SERPL-MCNC: 9.4 MG/DL (ref 8.5–10.1)
CHLORIDE SERPL-SCNC: 104 MMOL/L (ref 100–111)
CHOLEST SERPL-MCNC: 123 MG/DL
CO2 SERPL-SCNC: 28 MMOL/L (ref 21–32)
CREAT SERPL-MCNC: 0.93 MG/DL (ref 0.6–1.3)
EST. AVERAGE GLUCOSE BLD GHB EST-MCNC: 82 MG/DL
GLUCOSE SERPL-MCNC: 92 MG/DL (ref 74–99)
HBA1C MFR BLD: 4.5 % (ref 4.2–5.6)
HDLC SERPL-MCNC: 41 MG/DL (ref 40–60)
HDLC SERPL: 3 {RATIO} (ref 0–5)
LDLC SERPL CALC-MCNC: 67.2 MG/DL (ref 0–100)
LIPID PROFILE,FLP: NORMAL
POTASSIUM SERPL-SCNC: 3.9 MMOL/L (ref 3.5–5.5)
SODIUM SERPL-SCNC: 140 MMOL/L (ref 136–145)
TRIGL SERPL-MCNC: 74 MG/DL (ref ?–150)
VLDLC SERPL CALC-MCNC: 14.8 MG/DL

## 2022-09-29 PROCEDURE — 36415 COLL VENOUS BLD VENIPUNCTURE: CPT

## 2022-09-29 PROCEDURE — 80048 BASIC METABOLIC PNL TOTAL CA: CPT

## 2022-09-29 PROCEDURE — 87340 HEPATITIS B SURFACE AG IA: CPT

## 2022-09-29 PROCEDURE — 86803 HEPATITIS C AB TEST: CPT

## 2022-09-29 PROCEDURE — 83036 HEMOGLOBIN GLYCOSYLATED A1C: CPT

## 2022-09-29 PROCEDURE — 99395 PREV VISIT EST AGE 18-39: CPT | Performed by: NURSE PRACTITIONER

## 2022-09-29 PROCEDURE — 87389 HIV-1 AG W/HIV-1&-2 AB AG IA: CPT

## 2022-09-29 PROCEDURE — 80061 LIPID PANEL: CPT

## 2022-09-29 PROCEDURE — 87491 CHLMYD TRACH DNA AMP PROBE: CPT

## 2022-09-29 NOTE — PROGRESS NOTES
Subjective:   Fabiola Trujillo is a 24 y.o. male presenting for his annual checkup. ROS:  Feeling well. No dyspnea or chest pain on exertion. No abdominal pain, change in bowel habits, black or bloody stools. No urinary tract or prostatic symptoms. No neurological complaints. Patient is sexually active with a same-sex partner, presents for annual checkup and annual STD screening. Patient previously tested positive for syphilis infection that was previously treated by the health department in 2017. Specific concerns today: N/A. Patient Active Problem List   Diagnosis Code    Allergic rhinitis J30.9    Vitamin D deficiency E55.9    Contact dermatitis L25.9    Depression F32. A    Scoliosis M41.9    Syphilis A53.9     Current Outpatient Medications   Medication Sig Dispense Refill    triamcinolone acetonide (KENALOG) 0.025 % topical cream Apply  to affected area two (2) times a day. use thin layer (Patient not taking: Reported on 9/29/2022) 15 g 0    fexofenadine-pseudoephedrine (ALLEGRA-D)  mg Tb12 Take 1 Tab by mouth every twelve (12) hours. (Patient not taking: No sig reported)       Allergies   Allergen Reactions    Cranberry Extract Hives     Fruit     Past Medical History:   Diagnosis Date    Allergic rhinitis 04/25/2008    External records    Axis IV diagnosis 05/22/2008    External records    Childhood schizophrenia ([facility] 05/15/2011    External records    Contact dermatitis due to metal 08/11/2013    External records    Depression 05/06/2009    External records    Scoliosis 04/25/2008    External records    Severe major depression, single episode, with psychotic features ([facility] 11/09/2011    External records    Syphilis      History reviewed. No pertinent surgical history.      Lab Results   Component Value Date/Time    WBC 7.0 08/23/2021 02:35 PM    HGB 14.4 08/23/2021 02:35 PM    HCT 43.7 08/23/2021 02:35 PM    PLATELET 483 69/98/8412 02:35 PM    MCV 94.2 08/23/2021 02:35 PM     Lab Results Component Value Date/Time    Cholesterol, total 123 09/29/2022 03:41 PM    HDL Cholesterol 41 09/29/2022 03:41 PM    LDL, calculated 67.2 09/29/2022 03:41 PM    Triglyceride 74 09/29/2022 03:41 PM    CHOL/HDL Ratio 3.0 09/29/2022 03:41 PM     Lab Results   Component Value Date/Time    TSH 1.08 07/01/2020 02:00 PM      Lab Results   Component Value Date/Time    Sodium 140 09/29/2022 03:41 PM    Potassium 3.9 09/29/2022 03:41 PM    Chloride 104 09/29/2022 03:41 PM    CO2 28 09/29/2022 03:41 PM    Anion gap 8 09/29/2022 03:41 PM    Glucose 92 09/29/2022 03:41 PM    BUN 11 09/29/2022 03:41 PM    Creatinine 0.93 09/29/2022 03:41 PM    BUN/Creatinine ratio 12 09/29/2022 03:41 PM    GFR est AA >60 09/29/2022 03:41 PM    GFR est non-AA >60 09/29/2022 03:41 PM    Calcium 9.4 09/29/2022 03:41 PM      Lab Results   Component Value Date/Time    Hemoglobin A1c 4.5 09/29/2022 03:40 PM         Objective:     Visit Vitals  /75 (BP 1 Location: Right upper arm, BP Patient Position: Sitting, BP Cuff Size: Small adult)   Pulse 84   Temp 97.9 °F (36.6 °C) (Temporal)   Resp 18   Ht 5' 7\" (1.702 m)   Wt 201 lb (91.2 kg)   SpO2 97%   BMI 31.48 kg/m²     The patient appears well, alert, oriented x 3, in no distress. ENT normal.  Neck supple. No adenopathy or thyromegaly. NICOL. Lungs are clear, good air entry, no wheezes, rhonchi or rales. S1 and S2 normal, no murmurs, regular rate and rhythm. Abdomen is soft without tenderness, guarding, mass or organomegaly.  exam: PROSTATE EXAM: Deferred. Extremities show no edema, normal peripheral pulses. Neurological is normal without focal findings. Assessment/Plan:   healthy adult male  increase physical activity, continue present plan, routine labs ordered, have labs drawn prior to ROV. ICD-10-CM ICD-9-CM    1.  Routine medical exam  Z00.00 V70.0 HEMOGLOBIN A1C WITH EAG      METABOLIC PANEL, BASIC      LIPID PANEL      CHLAMYDIA/NEISSERIA AMPLIFICATION      HEP B SURFACE AG HEPATITIS C AB      HIV 1/2 AG/AB, 4TH GENERATION,W RFLX CONFIRM      RPR      CANCELED: T PALLIDUM AB      2. Vitamin D deficiency  E55.9 268.9       3. Encounter for immunization  Z23 V03.89 INFLUENZA, FLUARIX, FLULAVAL, FLUZONE (AGE 6 MO+), AFLURIA(AGE 3Y+) IM, PF, 0.5 ML      TDAP, BOOSTRIX, (AGE 10 YRS+), IM        Follow-up and Dispositions    Return in about 1 year (around 9/29/2023) for Well Male, (In Office), Labs 1 Week Prior. Tiera Summers

## 2022-09-29 NOTE — PROGRESS NOTES
1. \"Have you been to the ER, urgent care clinic since your last visit? Hospitalized since your last visit? \" No    2. \"Have you seen or consulted any other health care providers outside of the [address] [place] [address] since your last visit? \" Yes Neutrition      3. For patients aged 39-70: Has the patient had a colonoscopy / FIT/ Cologuard?  NA - based on age

## 2022-09-30 LAB
C TRACH RRNA SPEC QL NAA+PROBE: NEGATIVE
HBV SURFACE AG SER QL: <0.1 INDEX
HBV SURFACE AG SER QL: NEGATIVE
HCV AB SER IA-ACNC: <0.02 INDEX
HCV AB SERPL QL IA: NEGATIVE
HCV COMMENT,HCGAC: NORMAL
HIV 1+2 AB+HIV1 P24 AG SERPL QL IA: NONREACTIVE
HIV12 RESULT COMMENT, HHIVC: NORMAL
N GONORRHOEA RRNA SPEC QL NAA+PROBE: NEGATIVE
SPECIMEN SOURCE: NORMAL

## 2022-09-30 PROCEDURE — 90472 IMMUNIZATION ADMIN EACH ADD: CPT | Performed by: NURSE PRACTITIONER

## 2022-09-30 PROCEDURE — 90471 IMMUNIZATION ADMIN: CPT | Performed by: NURSE PRACTITIONER

## 2022-09-30 PROCEDURE — 90686 IIV4 VACC NO PRSV 0.5 ML IM: CPT | Performed by: NURSE PRACTITIONER

## 2022-09-30 PROCEDURE — 90715 TDAP VACCINE 7 YRS/> IM: CPT | Performed by: NURSE PRACTITIONER

## 2022-10-26 ENCOUNTER — OFFICE VISIT (OUTPATIENT)
Dept: FAMILY MEDICINE CLINIC | Age: 27
End: 2022-10-26
Payer: COMMERCIAL

## 2022-10-26 ENCOUNTER — HOSPITAL ENCOUNTER (OUTPATIENT)
Dept: LAB | Age: 27
Discharge: HOME | End: 2022-10-26
Payer: COMMERCIAL

## 2022-10-26 VITALS
TEMPERATURE: 98.1 F | RESPIRATION RATE: 18 BRPM | WEIGHT: 198 LBS | SYSTOLIC BLOOD PRESSURE: 117 MMHG | BODY MASS INDEX: 31.08 KG/M2 | DIASTOLIC BLOOD PRESSURE: 79 MMHG | HEART RATE: 91 BPM | OXYGEN SATURATION: 97 % | HEIGHT: 67 IN

## 2022-10-26 DIAGNOSIS — Z86.19 HX OF SYPHILIS: ICD-10-CM

## 2022-10-26 DIAGNOSIS — R39.9 UTI SYMPTOMS: ICD-10-CM

## 2022-10-26 DIAGNOSIS — F41.9 ANXIETY AND DEPRESSION: Primary | ICD-10-CM

## 2022-10-26 DIAGNOSIS — F32.A ANXIETY AND DEPRESSION: Primary | ICD-10-CM

## 2022-10-26 LAB
BILIRUB UR QL STRIP: NEGATIVE
GLUCOSE UR-MCNC: NEGATIVE MG/DL
KETONES P FAST UR STRIP-MCNC: NEGATIVE MG/DL
PH UR STRIP: 7.5 [PH] (ref 4.6–8)
PROT UR QL STRIP: NEGATIVE
RPR SER QL: REACTIVE
SP GR UR STRIP: 1.02 (ref 1–1.03)
UA UROBILINOGEN AMB POC: NORMAL (ref 0.2–1)
URINALYSIS CLARITY POC: CLEAR
URINALYSIS COLOR POC: YELLOW
URINE BLOOD POC: NEGATIVE
URINE LEUKOCYTES POC: NEGATIVE
URINE NITRITES POC: NEGATIVE

## 2022-10-26 PROCEDURE — 86780 TREPONEMA PALLIDUM: CPT

## 2022-10-26 PROCEDURE — 99214 OFFICE O/P EST MOD 30 MIN: CPT | Performed by: NURSE PRACTITIONER

## 2022-10-26 PROCEDURE — 36415 COLL VENOUS BLD VENIPUNCTURE: CPT

## 2022-10-26 PROCEDURE — 81003 URINALYSIS AUTO W/O SCOPE: CPT | Performed by: NURSE PRACTITIONER

## 2022-10-26 PROCEDURE — 86592 SYPHILIS TEST NON-TREP QUAL: CPT

## 2022-10-26 RX ORDER — BUSPIRONE HYDROCHLORIDE 15 MG/1
15 TABLET ORAL
Qty: 120 TABLET | Refills: 0 | Status: SHIPPED | OUTPATIENT
Start: 2022-10-26

## 2022-10-26 RX ORDER — ESCITALOPRAM OXALATE 10 MG/1
10 TABLET ORAL DAILY
Qty: 90 TABLET | Refills: 0 | Status: SHIPPED | OUTPATIENT
Start: 2022-10-26

## 2022-10-26 NOTE — PROGRESS NOTES
1. \"Have you been to the ER, urgent care clinic since your last visit? Hospitalized since your last visit? \" No    2. \"Have you seen or consulted any other health care providers outside of the [address] [place] [address] since your last visit? \" No     3. For patients aged 39-70: Has the patient had a colonoscopy / FIT/ Cologuard?  NA - based on age

## 2022-10-26 NOTE — PROGRESS NOTES
General Office Visit Note    Assessment/Plan:     Diagnoses and all orders for this visit:    1. Anxiety and depression  -     escitalopram oxalate (LEXAPRO) 10 mg tablet; Take 1 Tablet by mouth daily. -     busPIRone (BUSPAR) 15 mg tablet; Take 1 Tablet by mouth three (3) times daily as needed (anxiety). Indications: repeated episodes of anxiety    2. UTI symptoms  -     AMB POC URINALYSIS DIP STICK AUTO W/O MICRO    3. Hx of syphilis  -     RPR      Follow-up and Dispositions    Return in about 8 weeks (around 12/21/2022) for Depression/Anxiety, (In Office), (VV). Subjective:     Lukas Lindsay is a 24 y.o. y.o. male who complains of   Chief Complaint   Patient presents with    Anxiety    Depression    UTI     Depression and Anxiety Review:    Patient is seen for depression and anxiety disorder. Current treatment includes Zoloft and no other therapies. Ongoing depressive symptoms include depressed mood, psychomotor agitation, fatigue, feelings of worthlessness/guilt, hopelessness, impaired memory, recurrent thoughts of death, and side effects from the treatment:  \"grandmother told him he was not himself\" . Ongoing anxiety symptoms include: none. Patient denies:  suicidal thoughts with specific plan and suicidal attempt and  insomnia, racing thoughts, psychomotor agitation, difficulty concentrating. Reported side effects from the treatment: none. Triggers include: being evicted, where he is in life, his financial situation. His goals in life are to pay off debt, and leave [place].      Psychological ROS:   positive for - anxiety, depression, and sleep disturbances  negative for - suicidal ideation    3 most recent PHQ Screens 10/26/2022   Little interest or pleasure in doing things Nearly every day   Feeling down, depressed, irritable, or hopeless Nearly every day   Total Score PHQ 2 6   Trouble falling or staying asleep, or sleeping too much Nearly every day   Feeling tired or having little energy Nearly every day   Poor appetite, weight loss, or overeating More than half the days   Feeling bad about yourself - or that you are a failure or have let yourself or your family down More than half the days   Trouble concentrating on things such as school, work, reading, or watching TV Several days   Moving or speaking so slowly that other people could have noticed; or the opposite being so fidgety that others notice Not at all   Thoughts of being better off dead, or hurting yourself in some way More than half the days   PHQ 9 Score 19   How difficult have these problems made it for you to do your work, take care of your home and get along with others Somewhat difficult        RENITA 2/7 10/26/2022   Feeling nervous, anxious, or on edge 2   Not being able to stop or control worrying 2   Worrying too much about different things 3   Trouble relaxing 2   Being so restless that it is hard to sit still 3   Becoming easily annoyed or irritable 0   Feeling afraid as if something awful might happen 3   RENITA-7 Total Score 15        Depression Severity:   0-4 None, 5-9 mild, 10-14 moderate, 15-19 moderately severe, 20-27 severe. Anxiety Severity:   0-4 None/minimal, 5-9 mild, 10-14 moderate, 15-21 severe. Over 50% of the 30 minutes spent face to face with Claudia Motta   consisted of counseling and/or discussing treatment plans in reference to his diagnoses of:      ICD-10-CM ICD-9-CM    1. Anxiety and depression  F41.9 300.00 escitalopram oxalate (LEXAPRO) 10 mg tablet    F32. A 311 busPIRone (BUSPAR) 15 mg tablet      2. UTI symptoms  R39.9 788.99 AMB POC URINALYSIS DIP STICK AUTO W/O MICRO      3. Hx of syphilis  Z86.19 V12.09 RPR      . Urinary Changes:    Pt C/O  dysuria, frequency, urgency for 1 weeks. This is a new problem. Associated with discharge. He denies hematuria, foul smelling urine, nausea, vomiting, diarrhea, constipation, diurnal enuresis. He reports symptoms are improved.   Patient has tried: nothing for these symptoms. There is not a history of recurrent UTI. Results for orders placed or performed in visit on 10/26/22   AMB POC URINALYSIS DIP STICK AUTO W/O MICRO     Status: None   Result Value Ref Range Status    Color (UA POC) Yellow  Final    Clarity (UA POC) Clear  Final    Glucose (UA POC) Negative Negative Final    Bilirubin (UA POC) Negative Negative Final    Ketones (UA POC) Negative Negative Final    Specific gravity (UA POC) 1.020 1.001 - 1.035 Final    Blood (UA POC) Negative Negative Final    pH (UA POC) 7.5 4.6 - 8.0 Final    Protein (UA POC) Negative Negative Final    Urobilinogen (UA POC) 2 mg/dL 0.2 - 1 Final    Nitrites (UA POC) Negative Negative Final    Leukocyte esterase (UA POC) Negative Negative Final         Past Medical History:   Diagnosis Date    Allergic rhinitis 04/25/2008    External records    Axis IV diagnosis 05/22/2008    External records    Childhood schizophrenia ([facility] 05/15/2011    External records    Contact dermatitis due to metal 08/11/2013    External records    Depression 05/06/2009    External records    Scoliosis 04/25/2008    External records    Severe major depression, single episode, with psychotic features ([facility] 11/09/2011    External records    Syphilis      History reviewed. No pertinent surgical history. Social History     Socioeconomic History    Marital status: SINGLE   Tobacco Use    Smoking status: Never    Smokeless tobacco: Never   Substance and Sexual Activity    Drug use: No    Sexual activity: Yes     Partners: Male     Birth control/protection: Condom     Current Outpatient Medications   Medication Sig Dispense Refill    escitalopram oxalate (LEXAPRO) 10 mg tablet Take 1 Tablet by mouth daily. 90 Tablet 0    busPIRone (BUSPAR) 15 mg tablet Take 1 Tablet by mouth three (3) times daily as needed (anxiety).  Indications: repeated episodes of anxiety 120 Tablet 0    triamcinolone acetonide (KENALOG) 0.025 % topical cream Apply  to affected area two (2) times a day. use thin layer (Patient not taking: No sig reported) 15 g 0    fexofenadine-pseudoephedrine (ALLEGRA-D)  mg Tb12 Take 1 Tab by mouth every twelve (12) hours. (Patient not taking: No sig reported)       Allergies   Allergen Reactions    Cranberry Extract Hives     Fruit     The patient has a family history of    REVIEW OF SYSTEMS  ROS    Objective:     Visit Vitals  /79 (BP 1 Location: Right upper arm, BP Patient Position: Sitting, BP Cuff Size: Large adult)   Pulse 91   Temp 98.1 °F (36.7 °C) (Temporal)   Resp 18   Ht 5' 7\" (1.702 m)   Wt 198 lb (89.8 kg)   SpO2 97%   BMI 31.01 kg/m²       Current Outpatient Medications   Medication Instructions    busPIRone (BUSPAR) 15 mg, Oral, 3 TIMES DAILY AS NEEDED    escitalopram oxalate (LEXAPRO) 10 mg, Oral, DAILY    fexofenadine-pseudoephedrine (ALLEGRA-D)  mg Tb12 1 Tablet, EVERY 12 HOURS    triamcinolone acetonide (KENALOG) 0.025 % topical cream Topical, 2 TIMES DAILY, use thin layer        PHYSICAL EXAM  Physical Exam  Vitals and nursing note reviewed. Constitutional:       Appearance: Normal appearance. Cardiovascular:      Rate and Rhythm: Normal rate and regular rhythm. Pulses: Normal pulses. Heart sounds: Normal heart sounds. Pulmonary:      Effort: Pulmonary effort is normal.      Breath sounds: Normal breath sounds. Musculoskeletal:         General: Normal range of motion. Cervical back: Normal range of motion and neck supple. Skin:     General: Skin is warm and dry. Neurological:      Mental Status: He is alert and oriented to person, place, and time. Psychiatric:         Mood and Affect: Mood normal.         Behavior: Behavior normal.       Disclaimer: The patient understands our medical plan. Alternatives have been explained and offered. The risks, benefits and significant side effects of all medications have been reviewed. Anticipated time course and progression of condition reviewed.  All questions have been addressed. He is encouraged to employ the information provided in the after visit summary, which was reviewed. Where applicable, he is instructed to call the clinic if he has not been notified either by phone or through [employer] with the results of his tests or with an appointment plan for any referrals within 1 week(s). No news is not good news; it's no news. The patient  is to call if his condition worsens or fails to improve or if significant side effects are experienced. Aspects of this note may have been generated using voice recognition software. Despite editing, there may be unrecognized errors. condition worsens or fails to improve or if significant side effects are experienced. Please note that this dictation was completed with [employer], the computer voice recognition software. Quite often unanticipated grammatical, syntax, homophones, and other interpretive errors are inadvertently transcribed by the computer software. Please disregard these errors. Please excuse any errors that have escaped final proofreading.     Jane Otoole NP     10/26/2022

## 2022-10-28 LAB — T PALLIDUM AB SER QL IF: REACTIVE

## 2022-11-01 DIAGNOSIS — A53.9 SYPHILIS: Primary | ICD-10-CM

## 2022-11-01 RX ORDER — DOXYCYCLINE 100 MG/1
100 TABLET ORAL 2 TIMES DAILY
Qty: 56 TABLET | Refills: 0 | Status: SHIPPED | OUTPATIENT
Start: 2022-11-01 | End: 2022-11-29

## 2022-11-01 NOTE — PROGRESS NOTES
Please let the pt know that his follow-up testing came back positive and he will need treatment. Medication will be sent over to his pharmacy. He will need to be retested in 6 months and again at 12 months.

## 2022-11-01 NOTE — PROGRESS NOTES
Patient has been contacted about his labs. Informed that he does have medication sent to his pharmacy and he will need to pick that up and take it as prescribed. Patient asked if he could follow up with us for the lab appointments (6 months and 12 months). Informed him that if he would like to do this that is okay, however he will need to perform the labs a week before his scheduled appointment. Patient stated understanding and if he has any further questions he will contact us over the phone or via my chart.

## 2022-11-04 ENCOUNTER — TELEPHONE (OUTPATIENT)
Dept: FAMILY MEDICINE CLINIC | Age: 27
End: 2022-11-04

## 2022-11-04 NOTE — TELEPHONE ENCOUNTER
Abraham Benavidez from [address] requesting call back in reference to positive syphilis test requesting plan of action please advise. Arleth Bloodgojuliet Benavidez [phone]

## 2022-11-04 NOTE — TELEPHONE ENCOUNTER
Tried contacting Mr. Mccabe  back in regards to message from earlier today. No answer and left message on his phone to contact us back when he was available.

## 2022-11-22 ENCOUNTER — TELEPHONE (OUTPATIENT)
Dept: FAMILY MEDICINE CLINIC | Age: 27
End: 2022-11-22

## 2022-11-29 NOTE — TELEPHONE ENCOUNTER
Patient stated that he is concerned about the drug interaction between the lexapro and melatonin. If he is able to take the medication together or if he needs to take a different sleep aid. Spoke to Dr. Lindsey Quinn and was informed that there is no interaction between lexapro and melatonin. Patient was informed of this information and he thanked staff for the call. Call was ended.

## 2022-12-28 ENCOUNTER — VIRTUAL VISIT (OUTPATIENT)
Dept: FAMILY MEDICINE CLINIC | Age: 27
End: 2022-12-28

## 2022-12-28 NOTE — PROGRESS NOTES
Sending patient a my-chart message to inform him of the clinic trying to contact him about his appointment.

## 2022-12-28 NOTE — PROGRESS NOTES
Tried contacting patient to start his virtual at [phone] and received busy signal. Tried to send doxy to the same number and received a message that the phone was busy and to try again later. Tried contacting patients listed work number and received automated voice message that they are currently closed.

## 2023-01-06 ENCOUNTER — OFFICE VISIT (OUTPATIENT)
Dept: FAMILY MEDICINE CLINIC | Age: 28
End: 2023-01-06
Payer: COMMERCIAL

## 2023-01-06 VITALS
BODY MASS INDEX: 28.19 KG/M2 | OXYGEN SATURATION: 97 % | DIASTOLIC BLOOD PRESSURE: 75 MMHG | SYSTOLIC BLOOD PRESSURE: 114 MMHG | WEIGHT: 180 LBS | TEMPERATURE: 98.6 F | HEART RATE: 96 BPM

## 2023-01-06 DIAGNOSIS — F32.A ANXIETY AND DEPRESSION: Primary | ICD-10-CM

## 2023-01-06 DIAGNOSIS — F41.9 ANXIETY AND DEPRESSION: Primary | ICD-10-CM

## 2023-01-06 RX ORDER — BUPROPION HYDROCHLORIDE 150 MG/1
150 TABLET ORAL DAILY
Qty: 90 TABLET | Refills: 3 | Status: SHIPPED | OUTPATIENT
Start: 2023-01-06

## 2023-01-06 NOTE — PROGRESS NOTES
General Office Visit Note    Assessment/Plan:     Diagnoses and all orders for this visit:    1. Anxiety and depression  -     buPROPion XL (WELLBUTRIN XL) 150 mg tablet; Take 1 Tablet by mouth daily. Follow-up and Dispositions    Return in about 6 weeks (around 2/17/2023) for Depression/Anxiety, (VV). Subjective:     Krish Angel is a 24 y.o. y.o. male who complains of   Chief Complaint   Patient presents with    Anxiety    Depression     Depression and Anxiety Review:    Patient is seen for depression and anxiety disorder, sleep disturbance. Current treatment includes buspar, Lexapro and no other therapies. Ongoing depressive symptoms include depressed mood, fatigue, feelings of worthlessness/guilt, hopelessness, and recurrent thoughts of death. Ongoig anxiety symptoms include: palpitations, sweating, chest pain, shortness of breath, dizziness, racing thoughts, psychomotor agitation, feelings of losing control. Patient denies:  suicidal thoughts without plan, suicidal thoughts with specific plan, and suicidal attempt and  suicidal ideation, homocidal ideation. Reported side effects from the treatment: impotence, libido decreased.    Triggers include: Pt states he has felt better but the Lexapro has had sexual side effects in which he has not been able to get an erection since starting    Psychological ROS:   positive for - anxiety, decreased libido, depression, and sleep disturbances  negative for - suicidal ideation    3 most recent PHQ Screens 1/6/2023   Little interest or pleasure in doing things Not at all   Feeling down, depressed, irritable, or hopeless Not at all   Total Score PHQ 2 0   Trouble falling or staying asleep, or sleeping too much -   Feeling tired or having little energy -   Poor appetite, weight loss, or overeating -   Feeling bad about yourself - or that you are a failure or have let yourself or your family down -   Trouble concentrating on things such as school, work, reading, or watching TV -   Moving or speaking so slowly that other people could have noticed; or the opposite being so fidgety that others notice -   Thoughts of being better off dead, or hurting yourself in some way -   PHQ 9 Score -   How difficult have these problems made it for you to do your work, take care of your home and get along with others -        RENITA 2/7 10/26/2022   Feeling nervous, anxious, or on edge 2   Not being able to stop or control worrying 2   Worrying too much about different things 3   Trouble relaxing 2   Being so restless that it is hard to sit still 3   Becoming easily annoyed or irritable 0   Feeling afraid as if something awful might happen 3   RENITA-7 Total Score 15        Depression Severity:   0-4 None, 5-9 mild, 10-14 moderate, 15-19 moderately severe, 20-27 severe. Anxiety Severity:   0-4 None/minimal, 5-9 mild, 10-14 moderate, 15-21 severe. Over 50% of the 20 minutes spent face to face with Anthony Adler   consisted of counseling and/or discussing treatment plans in reference to his diagnoses of:      ICD-10-CM ICD-9-CM    1. Anxiety and depression  F41.9 300.00 buPROPion XL (WELLBUTRIN XL) 150 mg tablet    F32. A 311       . Past Medical History:   Diagnosis Date    Allergic rhinitis 04/25/2008    External records    Axis IV diagnosis 05/22/2008    External records    [facility] 05/15/2011    External records    Contact dermatitis due to metal 08/11/2013    External records    Depression 05/06/2009    External records    Scoliosis 04/25/2008    External records    Severe major depression, single episode, with psychotic features ([facility] 11/09/2011    External records    Syphilis      History reviewed. No pertinent surgical history.   Social History     Socioeconomic History    Marital status: SINGLE   Tobacco Use    Smoking status: Never    Smokeless tobacco: Never   Vaping Use    Vaping Use: Every day    Substances: Nicotine, THC, Flavoring    Devices: Disposable Substance and Sexual Activity    Alcohol use: Yes     Alcohol/week: 1.0 standard drink     Types: 1 Cans of beer per week     Comment: occasionally    Drug use: No     Comment: vape oil    Sexual activity: Yes     Partners: Male     Birth control/protection: Condom     Current Outpatient Medications   Medication Sig Dispense Refill    buPROPion XL (WELLBUTRIN XL) 150 mg tablet Take 1 Tablet by mouth daily. 90 Tablet 3    busPIRone (BUSPAR) 15 mg tablet Take 1 Tablet by mouth three (3) times daily as needed (anxiety). Indications: repeated episodes of anxiety 120 Tablet 0    fexofenadine-pseudoephedrine (ALLEGRA-D)  mg Tb12 Take 1 Tablet by mouth as needed. triamcinolone acetonide (KENALOG) 0.025 % topical cream Apply  to affected area two (2) times a day. use thin layer (Patient not taking: No sig reported) 15 g 0     Allergies   Allergen Reactions    Cranberry Extract Hives     Fruit     The patient has a family history of    REVIEW OF SYSTEMS  ROS    Objective:     Visit Vitals  /75 (BP 1 Location: Right upper arm, BP Patient Position: Sitting)   Pulse 96   Temp 98.6 °F (37 °C) (Temporal)   Wt 180 lb (81.6 kg)   SpO2 97%   BMI 28.19 kg/m²       Current Outpatient Medications   Medication Instructions    buPROPion XL (WELLBUTRIN XL) 150 mg, Oral, DAILY    busPIRone (BUSPAR) 15 mg, Oral, 3 TIMES DAILY AS NEEDED    fexofenadine-pseudoephedrine (ALLEGRA-D)  mg Tb12 1 Tablet, Oral, AS NEEDED    triamcinolone acetonide (KENALOG) 0.025 % topical cream Topical, 2 TIMES DAILY, use thin layer        PHYSICAL EXAM  Physical Exam  Vitals and nursing note reviewed. Constitutional:       Appearance: Normal appearance. Cardiovascular:      Rate and Rhythm: Normal rate and regular rhythm. Pulses: Normal pulses. Heart sounds: Normal heart sounds. Pulmonary:      Effort: Pulmonary effort is normal.      Breath sounds: Normal breath sounds.    Musculoskeletal:         General: Normal range of motion. Cervical back: Normal range of motion and neck supple. Skin:     General: Skin is warm and dry. Neurological:      Mental Status: He is alert and oriented to person, place, and time. Psychiatric:         Mood and Affect: Mood normal.         Behavior: Behavior normal.       Disclaimer: The patient understands our medical plan. Alternatives have been explained and offered. The risks, benefits and significant side effects of all medications have been reviewed. Anticipated time course and progression of condition reviewed. All questions have been addressed. He is encouraged to employ the information provided in the after visit summary, which was reviewed. Where applicable, he is instructed to call the clinic if he has not been notified either by phone or through [address] with the results of his tests or with an appointment plan for any referrals within 1 week(s). No news is not good news; it's no news. The patient  is to call if his condition worsens or fails to improve or if significant side effects are experienced. Aspects of this note may have been generated using voice recognition software. Despite editing, there may be unrecognized errors. condition worsens or fails to improve or if significant side effects are experienced. Please note that this dictation was completed with [employer], the computer voice recognition software. Quite often unanticipated grammatical, syntax, homophones, and other interpretive errors are inadvertently transcribed by the computer software. Please disregard these errors. Please excuse any errors that have escaped final proofreading.     Maninder Summers NP     1/6/2023

## 2023-01-06 NOTE — PROGRESS NOTES
1. \"Have you been to the ER, urgent care clinic since your last visit? Hospitalized since your last visit? \" No    2. \"Have you seen or consulted any other health care providers outside of the [address] [place] [address] since your last visit? \" No     3. For patients aged 39-70: Has the patient had a colonoscopy / FIT/ Cologuard? NA - based on age      If the patient is female:    4. For patients aged 41-77: Has the patient had a mammogram within the past 2 years? NA - based on age or sex      11. For patients aged 21-65: Has the patient had a pap smear?  NA - based on age or sex

## 2024-02-14 ENCOUNTER — TELEPHONE (OUTPATIENT)
Age: 29
End: 2024-02-14

## 2024-02-14 NOTE — TELEPHONE ENCOUNTER
Attempted to reach patient to schedule an office visit appointment that is due, no answer, lvm to contact office to schedule and/or update provider.    Last ov 1/6/23